# Patient Record
Sex: FEMALE | Race: WHITE | Employment: OTHER | URBAN - METROPOLITAN AREA
[De-identification: names, ages, dates, MRNs, and addresses within clinical notes are randomized per-mention and may not be internally consistent; named-entity substitution may affect disease eponyms.]

---

## 2023-03-01 ENCOUNTER — OFFICE VISIT (OUTPATIENT)
Age: 48
End: 2023-03-01
Payer: COMMERCIAL

## 2023-03-01 VITALS
SYSTOLIC BLOOD PRESSURE: 113 MMHG | HEART RATE: 89 BPM | WEIGHT: 196 LBS | TEMPERATURE: 97.4 F | OXYGEN SATURATION: 100 % | BODY MASS INDEX: 37 KG/M2 | DIASTOLIC BLOOD PRESSURE: 60 MMHG | HEIGHT: 61 IN | RESPIRATION RATE: 16 BRPM

## 2023-03-01 DIAGNOSIS — R10.12 LEFT UPPER QUADRANT ABDOMINAL PAIN: Primary | ICD-10-CM

## 2023-03-01 PROCEDURE — G8484 FLU IMMUNIZE NO ADMIN: HCPCS | Performed by: SPECIALIST

## 2023-03-01 PROCEDURE — G8417 CALC BMI ABV UP PARAM F/U: HCPCS | Performed by: SPECIALIST

## 2023-03-01 PROCEDURE — 99214 OFFICE O/P EST MOD 30 MIN: CPT | Performed by: SPECIALIST

## 2023-03-01 PROCEDURE — G8428 CUR MEDS NOT DOCUMENT: HCPCS | Performed by: SPECIALIST

## 2023-03-01 PROCEDURE — 1036F TOBACCO NON-USER: CPT | Performed by: SPECIALIST

## 2023-03-01 RX ORDER — TERBINAFINE HYDROCHLORIDE 250 MG/1
TABLET ORAL
COMMUNITY
Start: 2023-02-18

## 2023-03-01 NOTE — PROGRESS NOTES
Subjective:     Daniele Delatorre  is a 41 y.o. female who presents for follow-up about 14 years following gastric band placed in [place]. She has lost a total of 62 pounds since surgery. Body mass index is 37.03 kg/m². The patient presents today to assess their progress toward their goal of weight loss and to address any issues that may be present. Today the patient and I have reviewed their diet and how appropriate their food choices are. The following issues have been identified - she was last seen in the [facility] in May 2010 where her 7.8 cc band was not accessed as it was the appropriate level of tightness. She is her today with a 4 day history of acute port pain that is not associated with any trauma. Of note is the fact she is having no issues with PO intake, nausea, vomiting or overall dysphagia. Ambulatory Bariatric Summary 3/1/2023 1/5/1350   Systolic 031 920   Diastolic 60 82   Pulse 89 68   Temp 97.4 -   Resp 16 -   Weight 196 199   Height 61 62.5   BMI 37.1 kg/m2 35.9 kg/m2       Surgery related complication: - band placed in [place] with poor F/U       She reports pinpoint pain over her port and denies vomiting, cough, and difficulty breathing. Patients pain score: 0/10 in the office today     Ambulatory Bariatric Summary 3/1/2023 6/5/9219   Systolic 335 687   Diastolic 60 82   Pulse 89 68   Temp 97.4 -   Resp 16 -   Weight 196 199   Height 61 62.5   BMI 37.1 kg/m2 35.9 kg/m2        The patient's exercise level: moderately active. Changes in her medical history and medications have been reviewed. There is no problem list on file for this patient.     Past Medical History:   Diagnosis Date    Epilepsia [facility])     STD (female)      Past Surgical History:   Procedure Laterality Date    PARTIAL HYSTERECTOMY (CERVIX NOT REMOVED)  10/01/2016     Current Outpatient Medications   Medication Sig Dispense Refill    terbinafine (LAMISIL) 250 MG tablet TAKE 1 TABLET BY MOUTH EVERY DAY       No current facility-administered medications for this visit.         Review of Symptoms:       General - No history or complaints of unexpected fever or chills  Head/Neck - No history or complaints of headache or dizziness  Cardiac - No history or complaints of chest pain, palpitations, or shortness of breath  Pulmonary - No history or complaints of shortness of breath or productive cough  Gastrointestinal - as noted above  Genitourinary - No history or complaints of hematuria/dysuria or renal lithiasis  Musculoskeletal - No history or complaints of joint  muscular weakness  Hematologic - No history of any bleeding episodes  Neurologic - No history or complaints of  migraine headaches or neurologic symptoms                     Objective:     /60 (Site: Right Upper Arm, Position: Sitting, Cuff Size: Large Adult)   Pulse 89   Temp 97.4 °F (36.3 °C)   Resp 16   Ht 5' 1\" (1.549 m)   Wt 196 lb (88.9 kg)   SpO2 100%   BMI 37.03 kg/m²       Physical Examination:     General appearance:  alert, cooperative, no distress, appears stated age   Mental status   alert, oriented to person, place, and time   Neck  supple, no significant adenopathy     Chest  clear to auscultation, no wheezes, rales or rhonchi, symmetric air entry   Heart  normal rate, regular rhythm, normal S1, S2, no murmurs, rubs, clicks or gallops    Abdomen: soft, tender over the port with a possible displaced port, nondistended, no masses or organomegaly   Incision:  healing well, no drainage, no erythema, no hernia, no seroma, no swelling, no dehiscence, incision well approximated      Neurological  alert, oriented, normal speech, no focal findings or movement disorder noted   Extremities peripheral pulses normal, no pedal edema, no clubbing or cyanosis         No results found for: WBC, WBCLT, HGBPOC, HGB, HGBP, HCTPOC, HCT, PHCT, PLT, MCV  No results found for: NA, K, CL, CO2, AGAP, GLU, BUN, CREA, GFRAA, CA, TP, ALB, GLOB, ALT  No results found for: IRON, TIBC, IBCT, FERR  No results found for: FOL, RBCF  No results found for: VITD3, VD3RIA        Assessment:     1. History of Morbid obesity, status post gastric banding 14 years ago in [place]. The patient understands the nature of the banding system and the fact that the port is an implanted device and as such can \"tear\" away from the fascia and cause pain. Her exam would suggest that maybe her port could possibly be displaced. She is having no issues aside from port pain. I have spent a great deal of time discussing possible corrective measures should her port be displaced. Plan:     F/U in clinic next week for UGI evaluation of port and band.

## 2023-03-02 NOTE — PATIENT INSTRUCTIONS
Body Mass Index: Care Instructions  Your Care Instructions    Body mass index (BMI) can help you see if your weight is raising your risk for health problems. It uses a formula to compare how much you weigh with how tall you are. A BMI lower than 18.5 is considered underweight. A BMI between 18.5 and 24.9 is considered healthy. A BMI between 25 and 29.9 is considered overweight. A BMI of 30 or higher is considered obese. If your BMI is in the normal range, it means that you have a lower risk for weight-related health problems. If your BMI is in the overweight or obese range, you may be at increased risk for weight-related health problems, such as high blood pressure, heart disease, stroke, arthritis or joint pain, and diabetes. If your BMI is in the underweight range, you may be at increased risk for health problems such as fatigue, lower protection (immunity) against illness, muscle loss, bone loss, hair loss, and hormone problems. BMI is just one measure of your risk for weight-related health problems. You may be at higher risk for health problems if you are not active, you eat an unhealthy diet, or you drink too much alcohol or use tobacco products. Follow-up care is a key part of your treatment and safety. Be sure to make and go to all appointments, and call your doctor if you are having problems. It's also a good idea to know your test results and keep a list of the medicines you take. How can you care for yourself at home? Practice healthy eating habits. This includes eating plenty of fruits, vegetables, whole grains, lean protein, and low-fat dairy. If your doctor recommends it, get more exercise. Walking is a good choice. Bit by bit, increase the amount you walk every day. Try for at least 30 minutes on most days of the week. Do not smoke. Smoking can increase your risk for health problems. If you need help quitting, talk to your doctor about stop-smoking programs and medicines.  These can increase your chances of quitting for good. Limit alcohol to 2 drinks a day for men and 1 drink a day for women. Too much alcohol can cause health problems. If you have a BMI higher than 25  Your doctor may do other tests to check your risk for weight-related health problems. This may include measuring the distance around your waist. A waist measurement of more than 40 inches in men or 35 inches in women can increase the risk of weight-related health problems. Talk with your doctor about steps you can take to stay healthy or improve your health. You may need to make lifestyle changes to lose weight and stay healthy, such as changing your diet and getting regular exercise. If you have a BMI lower than 18.5  Your doctor may do other tests to check your risk for health problems. Talk with your doctor about steps you can take to stay healthy or improve your health. You may need to make lifestyle changes to gain or maintain weight and stay healthy, such as getting more healthy foods in your diet and doing exercises to build muscle. Where can you learn more? Go to http://www.gray.com/. Enter S176 in the search box to learn more about \"Body Mass Index: Care Instructions. \"  Current as of: June 26, 2018  Content Version: 11.8  © 1330-0365 [employer]. Care instructions adapted under license by [employer] (which disclaims liability or warranty for this information). If you have questions about a medical condition or this instruction, always ask your healthcare professional. [place] [address] any warranty or liability for your use of this information.

## 2023-03-08 ENCOUNTER — HOSPITAL ENCOUNTER (OUTPATIENT)
Facility: HOSPITAL | Age: 48
Discharge: HOME | End: 2023-03-11
Payer: COMMERCIAL

## 2023-03-08 VITALS
TEMPERATURE: 98.8 F | BODY MASS INDEX: 37.44 KG/M2 | RESPIRATION RATE: 18 BRPM | HEART RATE: 74 BPM | DIASTOLIC BLOOD PRESSURE: 80 MMHG | HEIGHT: 61 IN | SYSTOLIC BLOOD PRESSURE: 128 MMHG | WEIGHT: 198.3 LBS

## 2023-03-08 DIAGNOSIS — E66.01 MORBID OBESITY (HCC): ICD-10-CM

## 2023-03-08 PROCEDURE — 74220 X-RAY XM ESOPHAGUS 1CNTRST: CPT

## 2023-03-08 PROCEDURE — 74240 X-RAY XM UPR GI TRC 1CNTRST: CPT

## 2023-03-08 PROCEDURE — 2500000003 HC RX 250 WO HCPCS: Performed by: SPECIALIST

## 2023-03-08 PROCEDURE — 74240 X-RAY XM UPR GI TRC 1CNTRST: CPT | Performed by: SPECIALIST

## 2023-03-08 RX ADMIN — BARIUM SULFATE 100 ML: 960 POWDER, FOR SUSPENSION ORAL at 13:56

## 2023-03-08 NOTE — PROCEDURES
Lap Band Encounter (fluroscopy clinic)    Magalys Lozano is gastric banding patient who had her procedure on  .  her weight today is 198 lb 4.8 oz (89.9 kg), which correlates to  % EBW loss. she is here today for  . she notes the following issues related to the banding procedure; - she was last seen in the [address]  [facility] in May 2010 where her 7.8 cc band was not accessed as it was the appropriate level of tightness. She is her today with a 4 day history of acute port pain that is not associated with any trauma. Of note is the fact she is having no issues with PO intake, nausea, vomiting or overall dysphagia. .      Surgery related complications; NA    /67   Pulse 74   Temp 98.8 °F (37.1 °C)   Resp 18   Ht 5' 1\" (1.549 m)   Wt 198 lb 4.8 oz (89.9 kg)   BMI 37.47 kg/m²     Past Medical History:   Diagnosis Date    Epilepsia [facility])     STD (female)      Past Surgical History:   Procedure Laterality Date    PARTIAL HYSTERECTOMY (CERVIX NOT REMOVED)  10/01/2016     Current Outpatient Medications   Medication Sig Dispense Refill    terbinafine (LAMISIL) 250 MG tablet TAKE 1 TABLET BY MOUTH EVERY DAY       No current facility-administered medications for this encounter. Review of Symptoms:     General - No history or complaints of unexpected fever or chills  Cardiac - No history or complaints of chest pain, palpitations, or shortness of breath  Pulmonary - No history or complaints of shortness of breath or productive cough  Gastrointestinal - as noted above        Physical Exam:    General:  alert, appears stated age, cooperative, and no distress   Abdomen:   abdomen is soft without significant tenderness, masses, organomegaly or guarding; port in place   Incisions: healing well       Assessment:     1.  History of Morbid obesity, status post gastric banding, given the fluro findings of max tightness in a normal position band (with globular pouch) we will proceed with the following adjustment. Plan:     Previous Fill Volume:     Removed:       Total fill volume after today's adjustment:    Added:           No adjustment today - max tightness    Port in position       Follow-up in PRN

## 2023-03-08 NOTE — PROGRESS NOTES
[facility] UGI FOCUS NOTE      Date:  3/8/2023  Time:  1:39 PM    Patient:  Lily Baldwin  Procedure:  UGI      Patient Questions  Lap Band Adjustment Patient Questionnaire:  If female, are you pregnant? []Yes     [x]No  Tolerates thick liquids:  [x]Well   []1     []2     []3     []4     []5     []Poorly  Tolerates red meat:  []Well   []1     []2     []3     []4     []5     [x] Poorly  Tolerates chicken:  [x]Well   []1     []2     []3     []4     []5     []Poorly  Tolerates fish:   [x]Well   []1     []2     []3     []4     []5     []Poorly  Hunger is:   []Well Controlled     [x]1     []2     []3     []4     []5      [] Poorly Controlled  Nightime regurgitation:  [x]Never     []1     []2     []3     []4     []5     []Frequently    Lap Band Info:  Band Type  []Realize     []Realize-C     []AP     []VG     []10cm     []Unknown  []Other      Comments:        Ly Estrada RN